# Patient Record
(demographics unavailable — no encounter records)

---

## 2024-11-25 NOTE — PHYSICAL EXAM
[Alert] : alert [Normal Voice/Communication] : normal voice/communication [Healthy Appearing] : healthy appearing [No Acute Distress] : no acute distress [No Masses] : no abdominal mass palpated [Abdomen Soft] : soft [Oriented To Time, Place, And Person] : oriented to person, place, and time [RLQ] : RLQ [LLQ] : LLQ [Epigastric] : epigastric [No Respiratory Distress] : no respiratory distress

## 2024-11-28 NOTE — HISTORY OF PRESENT ILLNESS
[FreeTextEntry1] :  35 yo F no significant PMH, however has had "stomach issues" for many years including multiple days of loose BMs that would resolve without intervention, presents with constipation, diarrhea, heartburn, abdominal cramping, new diagnosis of SIBO.   C/o diarrhea, heartburn, abdominal cramping, and constipation. States diarrhea has decreased to 3xs/week since beginning to take Metamucil. Denies straining. States abdominal cramping occurs before having BM and throughout day, states it is not in a specific spot. C/o fecal urgency. C/o constant fatigue. 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 States she cannot stop eating throughout the day, states she has recent weight gain d/t eating.   States she has acid reflux daily, takes pepcid prn. Also taking Dicylomine prn as well. States she d/cd omeprazole because her PCP advised not to take d/t fam hx of osteoporosis. States when taking omeprazole it was helping s/s. C/o throat soreness, denies dysphagia. C/o coughing at night.    BM: States BMs are soft/mushy in between diarrhea, has BM once every other day now, used to be daily when having diarrhea. States when having episode of diarrhea, it occurs all day long,  occurring less since being on more fiber. States 1 episode of blood while wiping during BM a few weeks ago.   Denies drugs, tobacco, alcohol use.   11/2024: [facility] Smart Breath Test: + Intestional Methogenic Overgrowth States she had previous endsocopy years ago, and previous postivie H.pylori treatment x2, never tested to confirm eradication  PRIOR: 10/23/24 - patient has noted since GI problems got worse, feels very fatigued - her stools are slightly better in terms of less super watery diarrhea. Occasionally gets it once every week or two. - BMs still feel mushy - some days the BMs are slightly more formed than mush. Seems like an improvement. - has been taking fiber pills, has been taking metamucil as well - drinking more water - bought miralax, but did not try it yet. Not sure when she would need to use it. - Feels so tired all the time. - has had routine bloodwork, has a slight B12 deficiency  9/4/24 - after taking golytely, stool was firm for 1 week - the last few days, diarrhea has returned. However, patient ntoes she went on a trip and was not good with taking fiber etc - Pain has been better, but not without pain - more acid reflux symptoms rather than pain recently  8/7/24 visit: Reviewed results from recent bloodwork, stool studies, and abdominal x-ray GI PCR negative Pancreatic elastase wnl calprotecin wnl TSH/TTG IgA wnl  xray abd 2 views moderate amount of stool in the R colon and small amount of stool remainder of colon

## 2024-11-28 NOTE — REVIEW OF SYSTEMS
[Negative] : Heme/Lymph [Recent Weight Gain (___ Lbs)] : recent [unfilled] ~Ulb weight gain [Abdominal Pain] : abdominal pain [Constipation] : constipation [Diarrhea] : diarrhea [Heartburn] : heartburn [Vomiting] : no vomiting [Melena (black stool)] : no melena [Bleeding] : no bleeding [Fecal Incontinence (soiling)] : no fecal incontinence [Bloating (gassiness)] : no bloating

## 2024-11-28 NOTE — HISTORY OF PRESENT ILLNESS
[FreeTextEntry1] :  33 yo F no significant PMH, however has had "stomach issues" for many years including multiple days of loose BMs that would resolve without intervention, presents with constipation, diarrhea, heartburn, abdominal cramping, new diagnosis of SIBO.   C/o diarrhea, heartburn, abdominal cramping, and constipation. States diarrhea has decreased to 3xs/week since beginning to take Metamucil. Denies straining. States abdominal cramping occurs before having BM and throughout day, states it is not in a specific spot. C/o fecal urgency. C/o constant fatigue. 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 States she cannot stop eating throughout the day, states she has recent weight gain d/t eating.   States she has acid reflux daily, takes pepcid prn. Also taking Dicylomine prn as well. States she d/cd omeprazole because her PCP advised not to take d/t fam hx of osteoporosis. States when taking omeprazole it was helping s/s. C/o throat soreness, denies dysphagia. C/o coughing at night.    BM: States BMs are soft/mushy in between diarrhea, has BM once every other day now, used to be daily when having diarrhea. States when having episode of diarrhea, it occurs all day long,  occurring less since being on more fiber. States 1 episode of blood while wiping during BM a few weeks ago.   Denies drugs, tobacco, alcohol use.   11/2024: [facility] Smart Breath Test: + Intestional Methogenic Overgrowth States she had previous endsocopy years ago, and previous postivie H.pylori treatment x2, never tested to confirm eradication  PRIOR: 10/23/24 - patient has noted since GI problems got worse, feels very fatigued - her stools are slightly better in terms of less super watery diarrhea. Occasionally gets it once every week or two. - BMs still feel mushy - some days the BMs are slightly more formed than mush. Seems like an improvement. - has been taking fiber pills, has been taking metamucil as well - drinking more water - bought miralax, but did not try it yet. Not sure when she would need to use it. - Feels so tired all the time. - has had routine bloodwork, has a slight B12 deficiency  9/4/24 - after taking golytely, stool was firm for 1 week - the last few days, diarrhea has returned. However, patient ntoes she went on a trip and was not good with taking fiber etc - Pain has been better, but not without pain - more acid reflux symptoms rather than pain recently  8/7/24 visit: Reviewed results from recent bloodwork, stool studies, and abdominal x-ray GI PCR negative Pancreatic elastase wnl calprotecin wnl TSH/TTG IgA wnl  xray abd 2 views moderate amount of stool in the R colon and small amount of stool remainder of colon

## 2024-11-28 NOTE — END OF VISIT
[FreeTextEntry3] : Patient was seen and discussed with np bg bond Note was edited and amended as necessary Patient was physically seen at [address] [Time Spent: ___ minutes] : I have spent [unfilled] minutes of time on the encounter which excludes teaching and separately reported services.

## 2024-11-28 NOTE — ASSESSMENT
[FreeTextEntry1] :  35 yo F no significant PMH, however has had "stomach issues" for many years including multiple days of loose BMs that would resolve without intervention, presents with constipation, diarrhea, heartburn, recent positive IMO test.   #IMO #Constipation #Diarrhea: it is possible there is a component of IBS (has abdominal discomfort that improves after BMs), may be overflow incontinence -11/2024: [facility] Smart Breath Test: + Intestinal Methanogen Overgrowth, checked d/t low B12 labs - Order for Rifaximin TID and Flagyl for 14 days followed by 6 week course of Motegrity and low FODMAP diet - diarrhea improved with OTC metamucil and fiber supplements, however is now only having 1 BM every 2 days - TSH wnl, TTG IgA negative - stool studies negative for pancreatic insufficiency, GI PCR negative, calprotectin wnl - 7/2024: abdominal xray notable for R sided stool burden, suspicious for possible constipation with overflow diarrhea - referral to dietician: low FODMAP diet discussion to prevent SIBO from reoccuring - Denies trying Miralax, discussion on use if constipation continues and is not improved with fiber/Metamucil -States she has not had regular BMs for 15 years -Discussion on prescription laxatives in the future if constipation continues.  - continue dicyclomine PRN  #Heartburn - still has symptoms almost daily - can continue pepcid PRN -Omeprazole d/cd by PCP, if symptoms persist, can consider completing bravo or ph impedance to confirm acid reflux and justify PPI use  -Said she had previous endoscopy years ago, and previous positive H.pylori treatment x2, never tested to confirm eradication - H.pylori breath test ordered today -Discussion of potential future endoscopy, depending on s/s following SIBO tx  F/u in 3 months

## 2025-04-07 NOTE — ASSESSMENT
[FreeTextEntry1] :  35 yo F no significant PMH, lifelong GI issues, IMO s/p Rifaximin and Flagyl presents for follow up with improved diarrhea but persistent abdominal cramping and GERD.  #IMO s/p Flagyl & Rifaximin and 4 week course of Motegrity  #Constipation #Diarrhea, improved: likely there is a component of IBS (has abdominal discomfort that improves after BMs), may be overflow incontinence #Abdominal Cramping  -11/2024: [facility] Smart Breath Test: + Intestinal Methanogen Overgrowth, checked d/t low B12 labs - completed Rifaximin and Flagyl for 14 days followed by 4 week course of Motegrity and low FODMAP diet - diarrhea improved with OTC metamucil and fiber supplements, is now only having 1 formed BM daily - TSH wnl, TTG IgA negative - stool studies negative for pancreatic insufficiency, GI PCR negative, calprotectin wnl - 7/2024: abdominal xray notable for R sided stool burden, suspicious for possible constipation with overflow diarrhea - order xray to again assess stool burden to determine if cramping is r/t constipation - continue dicyclomine PRN - if recurrent symptoms, consider colonoscopy (none prior) - if no acid reflux noted and/or symptoms uncontrolled on PPI, can consider low dose amitriptyline  - previously given referral to dietician - Denies trying Miralax, discussion on use if constipation continues and is not improved with fiber/Metamucil - States she has not had regular BMs for 15 years -Discussion on prescription laxatives in the future if constipation continues.   #Heartburn - still has symptoms almost daily - can continue pepcid PRN - taking Omeprazole prn, previously d/cd by PCP -Said she had previous endoscopy years ago, and previous positive H.pylori treatment x2 - H.pylori breath test negative 11/24 - will need to stop PPI 7 days prior to procedure  - Schedule patient for EGD with Bravo to confirm acid reflux at [facility]. Discussed R/A/I/B with patient. Education provided on preparation instructions and the need for an escort.  F/u post procedure

## 2025-04-07 NOTE — HISTORY OF PRESENT ILLNESS
[FreeTextEntry1] : 33 yo F no significant PMH, lifelong GI issues, IMO s/p Rifaximin and Flagyl presents for follow up with improved diarrhea but persistent abdominal cramping and GERD.  Completed 14 day course of Rifaximin and Flagyl followed by 30day of Motegrity. Symptoms are only marginally better.  She is still having abdominal cramping, acid reflux. Diarrhea has resolved. Having 1 formed BM daily. 1-2x/week has looser BMs. Some incomplete evacuation, taking metamucil 1-2x/day. Denies rectal bleeding.  Abdominal cramping occurs a few times per week. Typically occurs in the morning. Improves after a BM. Taking dicyclomine as needed which helps.  Acid reflux and heartburn is occurring daily after eating taking pepcid prn or Omeprazole prn. Also experiencing symptoms at night.  Reports she does not get full, has to tell herself to stop eating. Weight has been stable.  A few days ago had an episode of light headedness, denies palpitations. Denies dizziness now.  Recent labs showed slightly b12 deficiency.   11/24 C/o diarrhea, heartburn, abdominal cramping, and constipation. States diarrhea has decreased to 3xs/week since beginning to take Metamucil. Denies straining. States abdominal cramping occurs before having BM and throughout day, states it is not in a specific spot. C/o fecal urgency. C/o constant fatigue. 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 States she cannot stop eating throughout the day, states she has recent weight gain d/t eating.   States she has acid reflux daily, takes pepcid prn. Also taking Dicylomine prn as well. States she d/cd omeprazole because her PCP advised not to take d/t fam hx of osteoporosis. States when taking omeprazole it was helping s/s. C/o throat soreness, denies dysphagia. C/o coughing at night.    BM: States BMs are soft/mushy in between diarrhea, has BM once every other day now, used to be daily when having diarrhea. States when having episode of diarrhea, it occurs all day long,  occurring less since being on more fiber. States 1 episode of blood while wiping during BM a few weeks ago.   Denies drugs, tobacco, alcohol use.   11/2024: [facility] Smart Breath Test: + Intestional Methogenic Overgrowth States she had previous endsocopy years ago, and previous postivie H.pylori treatment x2, never tested to confirm eradication  PRIOR: 10/23/24 - patient has noted since GI problems got worse, feels very fatigued - her stools are slightly better in terms of less super watery diarrhea. Occasionally gets it once every week or two. - BMs still feel mushy - some days the BMs are slightly more formed than mush. Seems like an improvement. - has been taking fiber pills, has been taking metamucil as well - drinking more water - bought miralax, but did not try it yet. Not sure when she would need to use it. - Feels so tired all the time. - has had routine bloodwork, has a slight B12 deficiency  9/4/24 - after taking golytely, stool was firm for 1 week - the last few days, diarrhea has returned. However, patient ntoes she went on a trip and was not good with taking fiber etc - Pain has been better, but not without pain - more acid reflux symptoms rather than pain recently  8/7/24 visit: Reviewed results from recent bloodwork, stool studies, and abdominal x-ray GI PCR negative Pancreatic elastase wnl calprotecin wnl TSH/TTG IgA wnl  xray abd 2 views moderate amount of stool in the R colon and small amount of stool remainder of colon

## 2025-04-07 NOTE — PHYSICAL EXAM
[Alert] : alert [Normal Voice/Communication] : normal voice/communication [Healthy Appearing] : healthy appearing [No Acute Distress] : no acute distress [No Respiratory Distress] : no respiratory distress [No Masses] : no abdominal mass palpated [Abdomen Soft] : soft [RLQ] : RLQ [LLQ] : LLQ [Epigastric] : epigastric [Oriented To Time, Place, And Person] : oriented to person, place, and time

## 2025-04-07 NOTE — REVIEW OF SYSTEMS
[Negative] : Heme/Lymph [Recent Weight Gain (___ Lbs)] : recent [unfilled] ~Ulb weight gain [Abdominal Pain] : abdominal pain [Constipation] : constipation [Diarrhea] : diarrhea [Heartburn] : heartburn [Vomiting] : no vomiting [Melena (black stool)] : no melena [Fecal Incontinence (soiling)] : no fecal incontinence [Swollen Glands] : no swollen glands